# Patient Record
Sex: FEMALE | Race: WHITE | URBAN - METROPOLITAN AREA
[De-identification: names, ages, dates, MRNs, and addresses within clinical notes are randomized per-mention and may not be internally consistent; named-entity substitution may affect disease eponyms.]

---

## 2021-02-28 ENCOUNTER — IMMUNIZATION (OUTPATIENT)
Dept: IMMUNIZATION | Facility: CLINIC | Age: 21
End: 2021-02-28

## 2021-04-11 ENCOUNTER — IMMUNIZATION (OUTPATIENT)
Dept: IMMUNIZATION | Facility: CLINIC | Age: 21
End: 2021-04-11

## 2021-05-12 ENCOUNTER — LAB REQUISITION (OUTPATIENT)
Dept: LAB | Facility: HOSPITAL | Age: 21
End: 2021-05-12
Attending: PREVENTIVE MEDICINE
Payer: COMMERCIAL

## 2021-05-12 DIAGNOSIS — Z00.8 ENCOUNTER FOR OTHER GENERAL EXAMINATION: ICD-10-CM

## 2021-05-12 PROCEDURE — 86480 TB TEST CELL IMMUN MEASURE: CPT | Performed by: PREVENTIVE MEDICINE

## 2021-05-14 LAB
M TB IFN-G BLD-IMP: NEGATIVE
MITOGEN-NIL: >10 IU/ML
NIL: 0.02 IU/ML
TB AG-NIL: 0 IU/ML
TB2 AG - NIL: 0.02 IU/ML

## 2022-01-03 LAB — EXTERNAL CHLAMYDIA RESULT: NEGATIVE

## 2022-01-18 LAB
APPEARANCE UR: ABNORMAL
BACTERIA UR QL AUTO: ABNORMAL /HPF
BILIRUB UR QL STRIP: NEGATIVE
COLOR UR: YELLOW
GLUCOSE UR QL STRIP: NEGATIVE
HGB UR QL STRIP: ABNORMAL
HYALINE CASTS #/AREA URNS LPF: ABNORMAL /LPF
KETONES UR QL STRIP: ABNORMAL
LEUKOCYTE ESTERASE UR QL STRIP: ABNORMAL
NITRITE UR QL STRIP: NEGATIVE
PH UR STRIP: ABNORMAL [PH] (ref 5–8)
PROT UR QL STRIP: ABNORMAL
RBC #/AREA URNS HPF: ABNORMAL /HPF
SP GR UR STRIP: 1.02 (ref 1–1.03)
SQUAMOUS #/AREA URNS HPF: ABNORMAL /HPF
WBC #/AREA URNS HPF: ABNORMAL /HPF

## 2022-03-12 RX ORDER — NORGESTIMATE AND ETHINYL ESTRADIOL 7DAYSX3 28
1 KIT ORAL DAILY
COMMUNITY
Start: 2022-01-03

## 2022-03-15 ENCOUNTER — TELEPHONE (OUTPATIENT)
Dept: ADMINISTRATIVE | Facility: OTHER | Age: 22
End: 2022-03-15

## 2022-03-15 ENCOUNTER — OFFICE VISIT (OUTPATIENT)
Dept: FAMILY MEDICINE CLINIC | Facility: CLINIC | Age: 22
End: 2022-03-15
Payer: COMMERCIAL

## 2022-03-15 VITALS
BODY MASS INDEX: 19.94 KG/M2 | DIASTOLIC BLOOD PRESSURE: 62 MMHG | OXYGEN SATURATION: 99 % | HEART RATE: 77 BPM | TEMPERATURE: 98 F | WEIGHT: 105.6 LBS | HEIGHT: 61 IN | SYSTOLIC BLOOD PRESSURE: 110 MMHG

## 2022-03-15 DIAGNOSIS — Z11.4 SCREENING FOR HIV (HUMAN IMMUNODEFICIENCY VIRUS): ICD-10-CM

## 2022-03-15 DIAGNOSIS — Z12.4 SCREENING FOR CERVICAL CANCER: ICD-10-CM

## 2022-03-15 DIAGNOSIS — Z11.59 NEED FOR HEPATITIS C SCREENING TEST: ICD-10-CM

## 2022-03-15 DIAGNOSIS — D22.9 ATYPICAL NEVUS: Primary | ICD-10-CM

## 2022-03-15 PROBLEM — R87.610 ATYPICAL SQUAMOUS CELLS OF UNDETERMINED SIGNIFICANCE (ASCUS) ON PAPANICOLAOU SMEAR OF CERVIX: Status: ACTIVE | Noted: 2022-01-03

## 2022-03-15 PROCEDURE — 1036F TOBACCO NON-USER: CPT | Performed by: INTERNAL MEDICINE

## 2022-03-15 PROCEDURE — 99385 PREV VISIT NEW AGE 18-39: CPT | Performed by: INTERNAL MEDICINE

## 2022-03-15 PROCEDURE — 3008F BODY MASS INDEX DOCD: CPT | Performed by: INTERNAL MEDICINE

## 2022-03-15 PROCEDURE — 3725F SCREEN DEPRESSION PERFORMED: CPT | Performed by: INTERNAL MEDICINE

## 2022-03-15 NOTE — PROGRESS NOTES
Assessment/Plan:    Problem List Items Addressed This Visit        Musculoskeletal and Integument    Atypical nevus - Primary     I would like to have Dermatology look at the nevus on her left posterior shoulder since it is irregular  This probably needs to be excised  They can also comment on whether or not there needs to be a revision of the nevus that was excised from her midthoracic back in 2009  She also has some skin tags which for now can be observed  Lesion at the top of her 5th for head seems to be benign but we can certainly have Dermatology take a look at that  Relevant Orders    Ambulatory Referral to Dermatology      Other Visit Diagnoses     Need for hepatitis C screening test        Relevant Orders    Hepatitis C Antibody ([facility])    Screening for HIV (human immunodeficiency virus)        Relevant Orders    HIV 1/2 Antigen/Antibody (4th Generation) w Reflex [facility]    Screening for cervical cancer            Her mom is going to look for her shot records to see if she has had the HPV vaccination  She has a gyn and recently had a Pap smear  Chief Complaint     Physical Exam          Patient ID: Reg Wheatley is a 24 y o  female who returns for a preventive visit  She has a skin lesion on her forehead, under her left breast, and her right axilla that she would like to have evaluated  Of note, she had a nevus that was removed from her midthoracic back by what sounds like a shave biopsy in 2009  Review of the pathology report from that procedure revealed that she did have a dysplastic nevus with the margins involved on the submitted specimen  She never did have a repeat resection of this area  She reports that there is some pigment that has redeveloped in the area of the excision on her midthoracic back        Objective:    /62 (BP Location: Left arm, Patient Position: Sitting)   Pulse 77   Temp 98 °F (36 7 °C)   Ht 5' 1" (1 549 m)   Wt 47 9 kg (105 lb 9 6 oz) SpO2 99%   BMI 19 95 kg/m²     Wt Readings from Last 3 Encounters:   03/15/22 47 9 kg (105 lb 9 6 oz)         Physical Exam  Vitals reviewed  Constitutional:       General: She is not in acute distress  Appearance: Normal appearance  She is well-developed  She is not ill-appearing  HENT:      Head: Normocephalic and atraumatic  Right Ear: Tympanic membrane and external ear normal       Left Ear: Tympanic membrane and external ear normal       Nose: Nose normal       Mouth/Throat:      Mouth: Mucous membranes are moist       Pharynx: Oropharynx is clear  Eyes:      General: No scleral icterus  Extraocular Movements: Extraocular movements intact  Pupils: Pupils are equal, round, and reactive to light  Neck:      Thyroid: No thyroid mass or thyromegaly  Vascular: No JVD  Cardiovascular:      Rate and Rhythm: Normal rate and regular rhythm  Heart sounds: Normal heart sounds  No murmur heard  No friction rub  No gallop  Pulmonary:      Breath sounds: Normal breath sounds  Abdominal:      General: Bowel sounds are normal  There is no distension  Palpations: Abdomen is soft  There is no mass  Musculoskeletal:         General: No swelling  Skin:     Comments: See images attached to this encounter  She has a well-healed scar in her midthoracic back that does have some mild tan pigmentation at the periphery  There was nothing suspicious appearing about the lesion itself  On the left posterior shoulder there is an 8 millimeter tan and brown irregularly shaped nevus  On her right axillary region there is a brownish skin tag which appears to be irritated  There is also a skin tag under her left breast which appears to be irritated as well  At the apex of her forehead there was a 1-2 millimeter pearly nodule that did not have any telangiectasia or ulceration  Neurological:      Mental Status: She is alert     Psychiatric:         Mood and Affect: Mood normal

## 2022-03-15 NOTE — TELEPHONE ENCOUNTER
----- Message from Raquel Spencer sent at 3/14/2022 10:58 AM EDT -----  Regarding: Care Gap Request  03/14/22 10:58 AM    Hello, our patient has had Chlamydia completed/performed  Please assist in updating the patient chart by pulling the Care Everywhere (CE) document  The date of service is 1/3/2022       Thank you,  Raqeul Spencer   [facility]

## 2022-03-15 NOTE — TELEPHONE ENCOUNTER
Upon review of the In Basket request we were able to locate, review, and update the patient chart as requested for Pap Smear (HPV) aka Cervical Cancer Screening  Any additional questions or concerns should be emailed to the Practice Liaisons via [email or web address] email, please do not reply via In Basket      Thank you  Carlos Raya

## 2022-03-15 NOTE — TELEPHONE ENCOUNTER
Upon review of the In Basket request we were able to locate, review, and update the patient chart as requested for Chlamydia  Any additional questions or concerns should be emailed to the Practice Liaisons via [email or web address] email, please do not reply via In Basket      Thank you  Missy Reese

## 2022-03-15 NOTE — TELEPHONE ENCOUNTER
----- Message from Mery Nolan sent at 3/14/2022 10:54 AM EDT -----  Regarding: Care Gap Request  03/14/22 10:54 AM    Hello, our patient has had Pap Smear (HPV) aka Cervical Cancer Screening completed/performed  Please assist in updating the patient chart by pulling the Care Everywhere (CE) document  The date of service is 1/3/2022       Thank you,  Mery Nolan   [facility]

## 2022-03-16 NOTE — ASSESSMENT & PLAN NOTE
I would like to have Dermatology look at the nevus on her left posterior shoulder since it is irregular  This probably needs to be excised  They can also comment on whether or not there needs to be a revision of the nevus that was excised from her midthoracic back in 2009  She also has some skin tags which for now can be observed  Lesion at the top of her 5th for head seems to be benign but we can certainly have Dermatology take a look at that

## 2022-06-15 ENCOUNTER — OFFICE VISIT (OUTPATIENT)
Dept: FAMILY MEDICINE CLINIC | Facility: CLINIC | Age: 22
End: 2022-06-15
Payer: COMMERCIAL

## 2022-06-15 VITALS
BODY MASS INDEX: 19.6 KG/M2 | HEART RATE: 81 BPM | DIASTOLIC BLOOD PRESSURE: 60 MMHG | TEMPERATURE: 98.2 F | WEIGHT: 103.8 LBS | SYSTOLIC BLOOD PRESSURE: 110 MMHG | HEIGHT: 61 IN | OXYGEN SATURATION: 98 %

## 2022-06-15 DIAGNOSIS — D22.9 ATYPICAL NEVUS: ICD-10-CM

## 2022-06-15 DIAGNOSIS — S29.011D MUSCLE STRAIN OF CHEST WALL, SUBSEQUENT ENCOUNTER: Primary | ICD-10-CM

## 2022-06-15 PROCEDURE — 3008F BODY MASS INDEX DOCD: CPT | Performed by: PHYSICIAN ASSISTANT

## 2022-06-15 PROCEDURE — 99213 OFFICE O/P EST LOW 20 MIN: CPT | Performed by: PHYSICIAN ASSISTANT

## 2022-06-15 PROCEDURE — 1036F TOBACCO NON-USER: CPT | Performed by: PHYSICIAN ASSISTANT

## 2022-06-15 NOTE — PROGRESS NOTES
Assessment/Plan:    No problem-specific Assessment & Plan notes found for this encounter  Diagnoses and all orders for this visit:    Muscle strain of chest wall, subsequent encounter    Atypical nevus      This patient has had pain in the right anterior lateral chest wall for the last month  No acute injury noted  She is right-handed dominant  She saw her gynecologist who performed a breast exam and identify the area of soreness in the right lateral chest wall  Patient has subsequently had an ultrasound performed and there was no adenopathy or masses noted  Performing full physical exam, I identified the area of tenderness attached to the insertion site of the chest wall muscle which moved with movement of her shoulder with flexion and abduction  This was the area she identified as being painful and it is a muscle strain or tendonitis in that muscle group  We had a discussion about the benign nature of this muscle pain  Patient has not yet scheduled her dermatology appointment for assessment of the atypical nevus of the left upper shoulder  There is some variegation in this nevus and I believe that Dr Bianka Ryan  referral was completely appropriate  I have reinforced the necessity of having dermatology evaluate this  Subjective:      Patient ID: Andriy Connor is a 24 y o  female  She has requested this visit as her 3rd visit evaluating the same complaint of pain in the right inferior axilla and chest wall  HPI:  This 50-year-old female sees Dr Otis Martinez for her primary care services  She had seen her gynecologist on May 12th due to pain in what she described the right axilla  A breast exam was performed revealing tenderness in the right axillary lateral chest wall  Ultrasound was performed at [facility] interpreted by Dr Manisha Whitney revealing no axillary adenopathy or masses  Patient describes this pain as a feeling like a bruising type pain but no bruise was present    She had been placed on amoxicillin by her gynecologist for this pain in the amoxicillin did nothing  Patient states that she does not do any activities which would strain any chest wall or shoulder girdle muscle  She has not really played sports since middle school basketball  She has never been in a marching band or served as a flag twirler  No overhead activities including no painting  Nothing seems to bring this pain on and nothing seems to relieve the pain  She is a side sleeper and tucks the right upper extremity underneath her pillow  No fever chills or night sweats  No adenopathy in any body part  A total of 22 minutes was spent rendering care for this patient  This time included review of the patient's electronic medical record, performing the history and physical, reviewing appropriate labs and/or images, developing a treatment and assessment plan, answering patient's questions and concerns, and documenting the patient visit  She has an appoint with Dr Rae January on March 20, 2022    Review of Systems  :  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   She is taking her boards in 1 week and then will start her job at [employer] in [place]  She is not unusually stressed  She denies any headache any neck pain any neck stiffness any nuchal rigidity  She denies paresthesias going down either upper extremity  She denies any weakness in upper or lower extremities  She admits to not having a lot of physical activity  She denied any changes in her bowel or bladder habits  She denies fever or chills      Objective:      /60 (BP Location: Left arm, Patient Position: Sitting, Cuff Size: Standard)   Pulse 81   Temp 98 2 °F (36 8 °C)   Ht 5' 1" (1 549 m)   Wt 47 1 kg (103 lb 12 8 oz)   SpO2 98%   BMI 19 61 kg/m²          Physical Exam  well-developed well-nourished pleasant 59-year-old female who was cooperative with the exam   She is appropriate in answering questions  Examination of the HEENT revealed normal range of motion of her neck  No pre or posterior auricular adenopathy  No cervical adenopathy  No supraclavicular adenopathy  Her chest is symmetric and nontender  Heart is regular rate without murmur rub or gallops  Lungs are clear to auscultation  Patient has asymmetry of the musculature in both lateral chest walls just below the axilla  She has increased muscle mass on the right side compared to the left and muscle was confirmed as this moves with motion of her shoulder  Palpation of this hypertrophied muscle reproduces her pain  It appears that this is a tendinitis in her chest wall that has just taken some time to respond conservative care  Patient's abdomen is flat and soft positive bowel sounds  She has no inguinal adenopathy  No epitrochlear adenopathy  Adequate peripheral pulses without peripheral edema  Integument:  Patient has a 1 x 1 cm variegated nevus which has been previously identified by Dr Sammi Morel with referral to Dermatology for further evaluation and possible biopsy  I have reinforced the need to have this evaluated

## 2022-06-15 NOTE — PATIENT INSTRUCTIONS
Patient to apply topical menthol such as blue emu 2 insertion site of her muscle in the right lateral chest wall  This muscle irritation is concerned by having what appears to be a slight hypertrophy of muscle move when I do abduction and flexion of her right shoulder  This muscle movement reproduces her pain  If heat is not soothing to the patient, try ice to the area

## 2023-01-05 LAB
EXTERNAL CHLAMYDIA RESULT: NOT DETECTED
N GONORRHOEA RRNA SPEC QL PROBE: NORMAL

## 2023-03-28 ENCOUNTER — LAB (OUTPATIENT)
Dept: LAB | Facility: CLINIC | Age: 23
End: 2023-03-28

## 2023-03-28 ENCOUNTER — OFFICE VISIT (OUTPATIENT)
Dept: FAMILY MEDICINE CLINIC | Facility: CLINIC | Age: 23
End: 2023-03-28

## 2023-03-28 VITALS
DIASTOLIC BLOOD PRESSURE: 78 MMHG | SYSTOLIC BLOOD PRESSURE: 122 MMHG | HEIGHT: 61 IN | OXYGEN SATURATION: 99 % | BODY MASS INDEX: 20.96 KG/M2 | WEIGHT: 111 LBS | TEMPERATURE: 97.3 F | HEART RATE: 78 BPM

## 2023-03-28 DIAGNOSIS — Z11.59 NEED FOR HEPATITIS C SCREENING TEST: ICD-10-CM

## 2023-03-28 DIAGNOSIS — R42 DIZZINESS: Primary | ICD-10-CM

## 2023-03-28 DIAGNOSIS — Z11.4 SCREENING FOR HIV (HUMAN IMMUNODEFICIENCY VIRUS): ICD-10-CM

## 2023-03-28 DIAGNOSIS — R42 DIZZINESS: ICD-10-CM

## 2023-03-28 DIAGNOSIS — D22.9 ATYPICAL NEVUS: ICD-10-CM

## 2023-03-28 DIAGNOSIS — Z23 NEED FOR HPV VACCINE: ICD-10-CM

## 2023-03-28 LAB
ERYTHROCYTE [DISTWIDTH] IN BLOOD BY AUTOMATED COUNT: 12.9 % (ref 11.6–15.1)
HCT VFR BLD AUTO: 43 % (ref 34.8–46.1)
HGB BLD-MCNC: 14.2 G/DL (ref 11.5–15.4)
MCH RBC QN AUTO: 29.7 PG (ref 26.8–34.3)
MCHC RBC AUTO-ENTMCNC: 33 G/DL (ref 31.4–37.4)
MCV RBC AUTO: 90 FL (ref 82–98)
PLATELET # BLD AUTO: 270 THOUSANDS/UL (ref 149–390)
PMV BLD AUTO: 12.3 FL (ref 8.9–12.7)
RBC # BLD AUTO: 4.78 MILLION/UL (ref 3.81–5.12)
WBC # BLD AUTO: 11.56 THOUSAND/UL (ref 4.31–10.16)

## 2023-03-28 NOTE — ASSESSMENT & PLAN NOTE
Nevus is unchanged but I stressed that she does need to have this evaluated and potentially excised  I put in another referral to dermatology

## 2023-03-28 NOTE — PROGRESS NOTES
"Assessment/Plan:    Problem List Items Addressed This Visit        Musculoskeletal and Integument    Atypical nevus     Nevus is unchanged but I stressed that she does need to have this evaluated and potentially excised  I put in another referral to dermatology  Relevant Orders    Ambulatory Referral to Dermatology       Other    Dizziness - Primary     Unclear etiology  EKG was normal   We will check CBC  Consider Holter monitor  Relevant Orders    CBC    POCT ECG (Completed)   Other Visit Diagnoses     Need for hepatitis C screening test        Relevant Orders    Hepatitis C Antibody ([facility])    Screening for HIV (human immunodeficiency virus)        Relevant Orders    HIV 1/2 AG/AB w Reflex [facility] for 2 yr old and above    Need for HPV vaccine        Relevant Orders    HPV VACCINE 9 VALENT IM          Chief Complaint    Physical Exam; light headed         Patient ID: Patrica Galarza is a 20 y o  female who returns for a preventive visit  She has been dizzy and lightheaded for the past 5 days  It has no relationship to position  No syncope  No vertiginous symptoms  No recent URI or GI infection  Objective:    /78 (BP Location: Left arm, Patient Position: Sitting, Cuff Size: Standard)   Pulse 78   Temp (!) 97 3 °F (36 3 °C)   Ht 5' 1\" (1 549 m)   Wt 50 3 kg (111 lb)   SpO2 99%   BMI 20 97 kg/m²     Wt Readings from Last 3 Encounters:   03/28/23 50 3 kg (111 lb)   06/15/22 47 1 kg (103 lb 12 8 oz)   03/15/22 47 9 kg (105 lb 9 6 oz)         Physical Exam  Vitals reviewed  Constitutional:       General: She is not in acute distress  Appearance: Normal appearance  She is well-developed  She is not ill-appearing  HENT:      Head: Normocephalic and atraumatic        Right Ear: Tympanic membrane and external ear normal       Left Ear: Tympanic membrane and external ear normal       Nose: Nose normal       Mouth/Throat:      Mouth: Mucous membranes are moist       Pharynx: " Oropharynx is clear  Eyes:      General: No scleral icterus  Extraocular Movements: Extraocular movements intact  Pupils: Pupils are equal, round, and reactive to light  Neck:      Thyroid: No thyroid mass or thyromegaly  Vascular: No JVD  Cardiovascular:      Rate and Rhythm: Normal rate and regular rhythm  Heart sounds: Normal heart sounds  No murmur heard  No friction rub  No gallop  Pulmonary:      Breath sounds: Normal breath sounds  Abdominal:      General: Bowel sounds are normal  There is no distension  Palpations: Abdomen is soft  There is no mass  Musculoskeletal:         General: No swelling  Skin:     Comments: 4 mm irregularly shaped nevus left posterior shoulder  Unchanged from last year  Neurological:      Mental Status: She is alert     Psychiatric:         Mood and Affect: Mood normal

## 2023-03-29 LAB
HCV AB SER QL: NORMAL
HIV 1+2 AB+HIV1 P24 AG SERPL QL IA: NORMAL
HIV 2 AB SERPL QL IA: NORMAL
HIV1 AB SERPL QL IA: NORMAL
HIV1 P24 AG SERPL QL IA: NORMAL

## 2023-03-30 ENCOUNTER — TELEPHONE (OUTPATIENT)
Dept: ADMINISTRATIVE | Facility: OTHER | Age: 23
End: 2023-03-30

## 2023-03-30 ENCOUNTER — HOSPITAL ENCOUNTER (EMERGENCY)
Facility: HOSPITAL | Age: 23
Discharge: HOME | End: 2023-03-30
Attending: EMERGENCY MEDICINE

## 2023-03-30 VITALS
BODY MASS INDEX: 20.66 KG/M2 | HEART RATE: 100 BPM | WEIGHT: 109.35 LBS | SYSTOLIC BLOOD PRESSURE: 131 MMHG | TEMPERATURE: 97.3 F | OXYGEN SATURATION: 100 % | DIASTOLIC BLOOD PRESSURE: 85 MMHG | RESPIRATION RATE: 16 BRPM

## 2023-03-30 DIAGNOSIS — R42 LIGHTHEADEDNESS: Primary | ICD-10-CM

## 2023-03-30 LAB
ANION GAP SERPL CALCULATED.3IONS-SCNC: 13 MMOL/L (ref 4–13)
BACTERIA UR QL AUTO: NORMAL /HPF
BASOPHILS # BLD AUTO: 0.03 THOUSANDS/ÂΜL (ref 0–0.1)
BASOPHILS NFR BLD AUTO: 0 % (ref 0–1)
BILIRUB UR QL STRIP: NEGATIVE
BUN SERPL-MCNC: 9 MG/DL (ref 5–25)
CALCIUM SERPL-MCNC: 9.1 MG/DL (ref 8.4–10.2)
CHLORIDE SERPL-SCNC: 104 MMOL/L (ref 96–108)
CLARITY UR: CLEAR
CO2 SERPL-SCNC: 19 MMOL/L (ref 21–32)
COLOR UR: ABNORMAL
CREAT SERPL-MCNC: 0.64 MG/DL (ref 0.6–1.3)
EOSINOPHIL # BLD AUTO: 0.14 THOUSAND/ÂΜL (ref 0–0.61)
EOSINOPHIL NFR BLD AUTO: 1 % (ref 0–6)
ERYTHROCYTE [DISTWIDTH] IN BLOOD BY AUTOMATED COUNT: 12.5 % (ref 11.6–15.1)
EXT PREGNANCY TEST URINE: NEGATIVE
EXT. CONTROL: NORMAL
GFR SERPL CREATININE-BSD FRML MDRD: 126 ML/MIN/1.73SQ M
GLUCOSE SERPL-MCNC: 80 MG/DL (ref 65–140)
GLUCOSE UR STRIP-MCNC: NEGATIVE MG/DL
HCT VFR BLD AUTO: 44.2 % (ref 34.8–46.1)
HGB BLD-MCNC: 14.4 G/DL (ref 11.5–15.4)
HGB UR QL STRIP.AUTO: NEGATIVE
IMM GRANULOCYTES # BLD AUTO: 0.04 THOUSAND/UL (ref 0–0.2)
IMM GRANULOCYTES NFR BLD AUTO: 0 % (ref 0–2)
KETONES UR STRIP-MCNC: ABNORMAL MG/DL
LEUKOCYTE ESTERASE UR QL STRIP: ABNORMAL
LYMPHOCYTES # BLD AUTO: 2.53 THOUSANDS/ÂΜL (ref 0.6–4.47)
LYMPHOCYTES NFR BLD AUTO: 25 % (ref 14–44)
MAGNESIUM SERPL-MCNC: 1.8 MG/DL (ref 1.9–2.7)
MCH RBC QN AUTO: 29.3 PG (ref 26.8–34.3)
MCHC RBC AUTO-ENTMCNC: 32.6 G/DL (ref 31.4–37.4)
MCV RBC AUTO: 90 FL (ref 82–98)
MONOCYTES # BLD AUTO: 0.64 THOUSAND/ÂΜL (ref 0.17–1.22)
MONOCYTES NFR BLD AUTO: 6 % (ref 4–12)
NEUTROPHILS # BLD AUTO: 6.74 THOUSANDS/ÂΜL (ref 1.85–7.62)
NEUTS SEG NFR BLD AUTO: 68 % (ref 43–75)
NITRITE UR QL STRIP: NEGATIVE
NON-SQ EPI CELLS URNS QL MICRO: NORMAL /HPF
NRBC BLD AUTO-RTO: 0 /100 WBCS
OTHER STN SPEC: NORMAL
PH UR STRIP.AUTO: 6 [PH]
PLATELET # BLD AUTO: 253 THOUSANDS/UL (ref 149–390)
PMV BLD AUTO: 11.5 FL (ref 8.9–12.7)
POTASSIUM SERPL-SCNC: 3.5 MMOL/L (ref 3.5–5.3)
PROT UR STRIP-MCNC: NEGATIVE MG/DL
RBC # BLD AUTO: 4.92 MILLION/UL (ref 3.81–5.12)
RBC #/AREA URNS AUTO: NORMAL /HPF
SODIUM SERPL-SCNC: 136 MMOL/L (ref 135–147)
SP GR UR STRIP.AUTO: <=1.005 (ref 1–1.03)
UROBILINOGEN UR QL STRIP.AUTO: 0.2 E.U./DL
WBC # BLD AUTO: 10.12 THOUSAND/UL (ref 4.31–10.16)
WBC #/AREA URNS AUTO: NORMAL /HPF

## 2023-03-30 RX ORDER — MAGNESIUM SULFATE 1 G/100ML
1 INJECTION INTRAVENOUS ONCE
Status: COMPLETED | OUTPATIENT
Start: 2023-03-30 | End: 2023-03-30

## 2023-03-30 RX ADMIN — MAGNESIUM SULFATE HEPTAHYDRATE 1 G: 1 INJECTION, SOLUTION INTRAVENOUS at 19:48

## 2023-03-30 NOTE — TELEPHONE ENCOUNTER
Upon review of the In Basket request we were able to locate, review, and update the patient chart as requested for Chlamydia  Any additional questions or concerns should be emailed to the Practice Liaisons via the appropriate education email address, please do not reply via In Basket      Thank you  Taylor Singh

## 2023-03-30 NOTE — ED NOTES
Pt unable to provide urine sample at this time  Made aware of need for sample        Ariel Jesus RN  03/30/23 [id number]

## 2023-03-30 NOTE — TELEPHONE ENCOUNTER
----- Message from Nancy Hayes sent at 3/29/2023  1:00 PM EDT -----  Regarding: Care Gap request  03/29/23 1:00 PM    Hello, our patient attached above has had Chlamydia completed/performed  Please assist in updating the patient chart by pulling the Care Everywhere (CE) document  The date of service is 1/5/2023       Thank you,  Camryn [facility]NDEZ [facility]

## 2023-03-30 NOTE — TELEPHONE ENCOUNTER
Upon review of the In Basket request we were able to locate, review, and update the patient chart as requested for Pap Smear (HPV) aka Cervical Cancer Screening  Any additional questions or concerns should be emailed to the Practice Liaisons via the appropriate education email address, please do not reply via In Basket      Thank you  Nazanin Goel

## 2023-03-30 NOTE — ED PROVIDER NOTES
"History  Chief Complaint   Patient presents with   • Dizziness     Started last Thursday with feeling lightheaded constantly, denies N/V/D, states nothing makes better or worse, seen Monday @PCP and had EKG and lab work, waiting on holter monitor, had eval @PT today and ruled out vertigo     79-year-old female presents emergency department with her mother for evaluation of lightheadedness  Patient states she has been feeling lightheaded \"24/7\" since last Thursday  States she is unsure what she was doing when this sensation started  Denies any traumatic or stressful event  Denies any sudden onset  States this was gradual however persistent  She denies any visual changes  Denies headache  Denies effusion or dizziness  She denies nausea or vomiting  States nothing seems to make sensation better or worse  Reports she was seen by her PCP and had blood work done which was negative  Per chart review this was a CBC, HIV and hepatitis panel that were performed  Patient was also evaluated by physical therapist today for vertigo  Patient reports physical therapist ruled out vertigo and said that this was unlikely cause of patient's dizziness  Patient denies any dysuria, hematuria or urinary frequency  She denies any chance of pregnancy  Patient reports a normal appetite  History provided by:  Patient  Dizziness  Quality:  Lightheadedness  Severity:  Mild  Onset quality:  Gradual  Timing:  Constant  Progression:  Unchanged  Chronicity:  New  Relieved by:  Nothing  Worsened by:  Nothing  Ineffective treatments:  Being still, food and lying down  Associated symptoms: no blood in stool, no chest pain, no diarrhea, no headaches, no hearing loss, no nausea, no palpitations, no shortness of breath, no syncope, no tinnitus, no vision changes, no vomiting and no weakness        Prior to Admission Medications   Prescriptions Last Dose Informant Patient Reported?  Taking?   norgestimate-ethinyl estradiol (ORTHO " TRI-CYCLEN,TRINESSA) 0 18/0 215/0 25 MG-35 MCG per tablet   Yes No   Sig: Take 1 tablet by mouth daily      Facility-Administered Medications: None       Past Medical History:   Diagnosis Date   • Dysplastic nevus 2009    DYSPLASTIC COMPOUND NEVUS WITH MILD ATYPIA, MARGINS INVOLVED       Past Surgical History:   Procedure Laterality Date   • WISDOM TOOTH EXTRACTION         Family History   Problem Relation Age of Onset   • Hypertension Father    • Hyperlipidemia Father    • Esophageal cancer Maternal Grandfather      I have reviewed and agree with the history as documented  E-Cigarette/Vaping   • E-Cigarette Use Never User      E-Cigarette/Vaping Substances     Social History     Tobacco Use   • Smoking status: Never   • Smokeless tobacco: Never   Vaping Use   • Vaping Use: Never used   Substance Use Topics   • Alcohol use: Yes     Alcohol/week: 2 0 standard drinks     Types: 2 Standard drinks or equivalent per week     Comment: social   • Drug use: Never       Review of Systems   Constitutional: Negative  HENT: Negative for hearing loss and tinnitus  Respiratory: Negative  Negative for shortness of breath  Cardiovascular: Negative  Negative for chest pain, palpitations and syncope  Gastrointestinal: Negative  Negative for blood in stool, diarrhea, nausea and vomiting  Genitourinary: Negative  Musculoskeletal: Negative  Skin: Negative  Neurological: Positive for dizziness and light-headedness  Negative for weakness and headaches  All other systems reviewed and are negative  Physical Exam  Physical Exam  Vitals and nursing note reviewed  Constitutional:       General: She is not in acute distress  Appearance: Normal appearance  She is normal weight  She is not ill-appearing, toxic-appearing or diaphoretic  HENT:      Head: Normocephalic and atraumatic  Comments: Patient reported lightheadedness worsened with eye movement  No nystagmus    She reported no change with head rotation     Right Ear: Tympanic membrane, ear canal and external ear normal       Left Ear: Tympanic membrane, ear canal and external ear normal       Nose: Nose normal  No congestion or rhinorrhea  Mouth/Throat:      Mouth: Mucous membranes are moist       Pharynx: Oropharynx is clear  No oropharyngeal exudate or posterior oropharyngeal erythema  Eyes:      Extraocular Movements: Extraocular movements intact  Conjunctiva/sclera: Conjunctivae normal       Pupils: Pupils are equal, round, and reactive to light  Comments: No nystagmus   Cardiovascular:      Rate and Rhythm: Normal rate and regular rhythm  Pulmonary:      Effort: Pulmonary effort is normal  No respiratory distress  Breath sounds: Normal breath sounds  No stridor  No wheezing, rhonchi or rales  Chest:      Chest wall: No tenderness  Abdominal:      General: Abdomen is flat  Bowel sounds are normal  There is no distension  Palpations: Abdomen is soft  Tenderness: There is no abdominal tenderness  There is no guarding  Musculoskeletal:         General: Normal range of motion  Cervical back: Normal range of motion  Skin:     General: Skin is warm and dry  Capillary Refill: Capillary refill takes less than 2 seconds  Findings: No bruising, erythema or rash  Neurological:      General: No focal deficit present  Mental Status: She is alert and oriented to person, place, and time  GCS: GCS eye subscore is 4  GCS verbal subscore is 5  GCS motor subscore is 6  Cranial Nerves: Cranial nerves 2-12 are intact  Sensory: Sensation is intact  Motor: Motor function is intact  Coordination: Coordination is intact  Finger-Nose-Finger Test normal       Gait: Gait is intact     Psychiatric:         Mood and Affect: Mood normal          Behavior: Behavior normal          Vital Signs  ED Triage Vitals [03/30/23 1805]   Temperature Pulse Respirations Blood Pressure SpO2   (!) 97 3 °F (36 3 °C) 92 16 134/87 100 %      Temp src Heart Rate Source Patient Position - Orthostatic VS BP Location FiO2 (%)   -- Monitor Lying Right arm --      Pain Score       --           Vitals:    03/30/23 1805 03/30/23 1830   BP: 134/87 131/85   Pulse: 92 100   Patient Position - Orthostatic VS: Lying          Visual Acuity      ED Medications  Medications   magnesium sulfate IVPB (premix) SOLN 1 g (0 g Intravenous Stopped 3/30/23 2100)       Diagnostic Studies  Results Reviewed     Procedure Component Value Units Date/Time    Urine Microscopic [[id number]] Collected: 03/30/23 1921    Lab Status: Final result Specimen: Urine, Clean Catch Updated: 03/30/23 1953     RBC, UA 0-1 /hpf      WBC, UA 2-4 /hpf      Epithelial Cells Occasional /hpf      Bacteria, UA Occasional /hpf      OTHER OBSERVATIONS Yeast Cells Present    UA (URINE) with reflex to Scope [[id number]]  (Abnormal) Collected: 03/30/23 1921    Lab Status: Final result Specimen: Urine, Clean Catch Updated: 03/30/23 1932     Color, UA Straw     Clarity, UA Clear     Specific Gravity, UA <=1 005     pH, UA 6 0     Leukocytes, UA Small     Nitrite, UA Negative     Protein, UA Negative mg/dl      Glucose, UA Negative mg/dl      Ketones, UA Trace mg/dl      Urobilinogen, UA 0 2 E U /dl      Bilirubin, UA Negative     Occult Blood, UA Negative    POCT pregnancy, urine [[id number]]  (Normal) Resulted: 03/30/23 1921    Lab Status: Final result Updated: 03/30/23 1925     EXT Preg Test, Ur Negative     Control Valid    Basic metabolic panel [[id number]]  (Abnormal) Collected: 03/30/23 1846    Lab Status: Final result Specimen: Blood from Arm, Right Updated: 03/30/23 1919     Sodium 136 mmol/L      Potassium 3 5 mmol/L      Chloride 104 mmol/L      CO2 19 mmol/L      ANION GAP 13 mmol/L      BUN 9 mg/dL      Creatinine 0 64 mg/dL      Glucose 80 mg/dL      Calcium 9 1 mg/dL      eGFR 126 ml/min/1 73sq m     Narrative:      [place] guidelines for Chronic Kidney Disease (CKD):   •  Stage 1 with normal or high GFR (GFR > 90 mL/min/1 73 square meters)  •  Stage 2 Mild CKD (GFR = 60-89 mL/min/1 73 square meters)  •  Stage 3A Moderate CKD (GFR = 45-59 mL/min/1 73 square meters)  •  Stage 3B Moderate CKD (GFR = 30-44 mL/min/1 73 square meters)  •  Stage 4 Severe CKD (GFR = 15-29 mL/min/1 73 square meters)  •  Stage 5 End Stage CKD (GFR <15 mL/min/1 73 square meters)  Note: GFR calculation is accurate only with a steady state creatinine    Magnesium [[id number]]  (Abnormal) Collected: 03/30/23 1846    Lab Status: Final result Specimen: Blood from Arm, Right Updated: 03/30/23 1919     Magnesium 1 8 mg/dL     CBC and differential [[id number]] Collected: 03/30/23 1846    Lab Status: Final result Specimen: Blood from Arm, Right Updated: 03/30/23 1904     WBC 10 12 Thousand/uL      RBC 4 92 Million/uL      Hemoglobin 14 4 g/dL      Hematocrit 44 2 %      MCV 90 fL      MCH 29 3 pg      MCHC 32 6 g/dL      RDW 12 5 %      MPV 11 5 fL      Platelets 984 Thousands/uL      nRBC 0 /100 WBCs      Neutrophils Relative 68 %      Immat GRANS % 0 %      Lymphocytes Relative 25 %      Monocytes Relative 6 %      Eosinophils Relative 1 %      Basophils Relative 0 %      Neutrophils Absolute 6 74 Thousands/µL      Immature Grans Absolute 0 04 Thousand/uL      Lymphocytes Absolute 2 53 Thousands/µL      Monocytes Absolute 0 64 Thousand/µL      Eosinophils Absolute 0 14 Thousand/µL      Basophils Absolute 0 03 Thousands/µL                  No orders to display              Procedures  ECG 12 Lead Documentation Only    Date/Time: 3/30/2023 6:59 PM  Performed by: David Lemus PA-C  Authorized by: David Lemus PA-C     Patient location:  ED  Interpretation:     Interpretation: non-specific    Rate:     ECG rate:  89    ECG rate assessment: normal    Rhythm:     Rhythm: sinus rhythm    Ectopy:     Ectopy: none    QRS:     QRS axis:  Normal    QRS intervals:  Normal  Conduction: Conduction: normal               ED Course  ED Course as of 03/31/23 1026   Thu Mar 30, 2023   1916 WBC: 10 12   1916 Hemoglobin: 14 4   1926 Magnesium(!): 1 8   1926 PREGNANCY TEST URINE: Negative   1926 Patient's urine is negative for UTI  She denies any urinary symptoms including dysuria, hematuria or urinary frequency  There was noted yeast which I discussed with the patient  She denies any abnormal vaginal discharge or vaginal itch    1926 I again discussed all results and findings with the patient and mother  We discussed symptomatic treatment at home and return precautions  We discussed the appropriate follow-up with PCP  We discussed staying well-hydrated  Patient was agreeable this treatment plan she was clinically and hemodynamically stable for discharge                 Medical Decision Making  79-year-old female presented to the emergency department for evaluation of lightheadedness for the past several days consistent  Vitals and medical record reviewed  Differential diagnosis included but not limited to: Pregnancy, electrolyte abnormality, arrhythmia  Patient's laboratory findings were relatively unremarkable  Very minimally low magnesium which was repleted in the emergency department  Urine pregnancy negative  UA negative for UTI, likely contaminated  Hemoglobin normal   EKG was normal sinus rhythm  We discussed all results and findings  We discussed symptomatic treatment at home and return precautions we discussed appropriate follow-up and patient and mother verbalized understanding  Patient was clinically and hemodynamically stable for discharge    Lightheadedness: acute illness or injury  Amount and/or Complexity of Data Reviewed  External Data Reviewed: labs and notes  Labs: ordered  Decision-making details documented in ED Course  ECG/medicine tests: ordered and independent interpretation performed  Risk  Prescription drug management            Disposition  Final diagnoses: Lightheadedness     Time reflects when diagnosis was documented in both MDM as applicable and the Disposition within this note     Time User Action Codes Description Comment    3/30/2023  8:36 PM Jacek Kamaraia Add [R42] [address] [facility]       ED Disposition     ED Disposition   Discharge    Condition   Stable    Date/Time   Thu Mar 30, 2023  8:36 PM    Comment   Blanco Esteves discharge to home/self care  Follow-up Information     Follow up With Specialties Details Why [address]undo Iniguez MD Internal Medicine   [address] [facility] [address]  [phone]            Discharge Medication List as of 3/30/2023  8:37 PM      CONTINUE these medications which have NOT CHANGED    Details   norgestimate-ethinyl estradiol (ORTHO TRI-CYCLEN,TRINESSA) 0 18/0 215/0 25 MG-35 MCG per tablet Take 1 tablet by mouth daily, Starting Mon 1/3/2022, Historical Med             No discharge procedures on file      PDMP Review     None          ED Provider  Electronically Signed by           Lorriane Duane, PA-C  03/31/23 9478

## 2023-03-31 LAB
ATRIAL RATE: 89 BPM
P AXIS: 75 DEGREES
PR INTERVAL: 142 MS
QRS AXIS: 72 DEGREES
QRSD INTERVAL: 74 MS
QT INTERVAL: 364 MS
QTC INTERVAL: 442 MS
T WAVE AXIS: 58 DEGREES
VENTRICULAR RATE: 89 BPM

## 2023-04-03 ENCOUNTER — HOSPITAL ENCOUNTER (OUTPATIENT)
Dept: NON INVASIVE DIAGNOSTICS | Facility: HOSPITAL | Age: 23
Discharge: HOME | End: 2023-04-03
Attending: INTERNAL MEDICINE

## 2023-04-03 DIAGNOSIS — R42 DIZZINESS: ICD-10-CM

## 2023-04-25 ENCOUNTER — TELEPHONE (OUTPATIENT)
Dept: FAMILY MEDICINE CLINIC | Facility: CLINIC | Age: 23
End: 2023-04-25

## 2023-04-25 DIAGNOSIS — R42 DIZZINESS: Primary | ICD-10-CM

## 2023-04-25 NOTE — TELEPHONE ENCOUNTER
Called patients mother made aware order placed for MRI, she said will call to schedule gave her the number for central scheduling

## 2023-04-25 NOTE — TELEPHONE ENCOUNTER
Patients mother called regarding patients neurology consult, They reached out to the neurologist numbers that we provided them with, and they are unable to get an appointment until November  She states that her daughters symptoms are not improving and she is very concerned  She wants an MRI ordered now she is not worried if the insurance will cover it or not, she is more concerned with her daughters health  She said if it not addressed today her family will be looking for a new PCP office

## 2023-05-10 ENCOUNTER — HOSPITAL ENCOUNTER (OUTPATIENT)
Dept: MRI IMAGING | Facility: HOSPITAL | Age: 23
Discharge: HOME | End: 2023-05-10
Attending: INTERNAL MEDICINE

## 2023-05-10 DIAGNOSIS — R42 DIZZINESS: ICD-10-CM

## 2023-05-10 RX ADMIN — GADOBUTROL 5 ML: 604.72 INJECTION INTRAVENOUS at 13:17

## 2023-08-17 ENCOUNTER — RA CDI HCC (OUTPATIENT)
Dept: OTHER | Facility: HOSPITAL | Age: 23
End: 2023-08-17

## 2023-08-17 NOTE — PROGRESS NOTES
[address] [facility] coding opportunities       Chart reviewed, no opportunity found: CHART REVIEWED, NO OPPORTUNITY FOUND        Patients Insurance        Commercial Insurance: [employer]

## 2023-08-29 ENCOUNTER — OFFICE VISIT (OUTPATIENT)
Dept: FAMILY MEDICINE CLINIC | Facility: CLINIC | Age: 23
End: 2023-08-29
Payer: COMMERCIAL

## 2023-08-29 VITALS
BODY MASS INDEX: 20.43 KG/M2 | WEIGHT: 108.2 LBS | SYSTOLIC BLOOD PRESSURE: 123 MMHG | HEIGHT: 61 IN | HEART RATE: 85 BPM | DIASTOLIC BLOOD PRESSURE: 62 MMHG | OXYGEN SATURATION: 100 %

## 2023-08-29 DIAGNOSIS — F41.1 GAD (GENERALIZED ANXIETY DISORDER): Primary | ICD-10-CM

## 2023-08-29 PROCEDURE — 99213 OFFICE O/P EST LOW 20 MIN: CPT | Performed by: INTERNAL MEDICINE

## 2023-08-29 RX ORDER — VENLAFAXINE HYDROCHLORIDE 37.5 MG/1
37.5 CAPSULE, EXTENDED RELEASE ORAL
Qty: 30 CAPSULE | Refills: 5 | Status: SHIPPED | OUTPATIENT
Start: 2023-08-29

## 2023-08-29 NOTE — ASSESSMENT & PLAN NOTE
We will start her on venlafaxine Exar 37.5 mg daily. Discussed side effects. We'll reassess in about 4 weeks.

## 2023-08-29 NOTE — PROGRESS NOTES
Assessment/Plan:    Problem List Items Addressed This Visit        Other    CASSI (generalized anxiety disorder) - Primary     We will start her on venlafaxine Exar 37.5 mg daily. Discussed side effects. We'll reassess in about 4 weeks. Relevant Medications    venlafaxine (EFFEXOR-XR) 37.5 mg 24 hr capsule       Chief Complaint    Anxiety         Patient ID: Shane Dave is a 20 y.o. female who returns for evaluation of anxiety. She had some issues in sophomore of college. Has been worse the past few months. She had an episode in July when she was very anxious and couldn't catch her breath. Afterwards, she cried for a couple of hours. She hasn't been using alcohol or drugs. Her CASSI-7 today is 12.       Objective:    /62 (BP Location: Right arm, Patient Position: Sitting, Cuff Size: Standard)   Pulse 85   Ht 5' 1" (1.549 m)   Wt 49.1 kg (108 lb 3.2 oz)   SpO2 100%   BMI 20.44 kg/m²     Wt Readings from Last 3 Encounters:   08/29/23 49.1 kg (108 lb 3.2 oz)   03/30/23 49.6 kg (109 lb 5.6 oz)   03/28/23 50.3 kg (111 lb)         Physical Exam

## 2023-09-20 DIAGNOSIS — F41.1 GAD (GENERALIZED ANXIETY DISORDER): ICD-10-CM

## 2023-09-20 RX ORDER — VENLAFAXINE HYDROCHLORIDE 37.5 MG/1
CAPSULE, EXTENDED RELEASE ORAL
Qty: 90 CAPSULE | Refills: 3 | Status: SHIPPED | OUTPATIENT
Start: 2023-09-20

## 2023-11-28 ENCOUNTER — OFFICE VISIT (OUTPATIENT)
Dept: FAMILY MEDICINE CLINIC | Facility: CLINIC | Age: 23
End: 2023-11-28
Payer: COMMERCIAL

## 2023-11-28 VITALS
HEIGHT: 61 IN | HEART RATE: 75 BPM | OXYGEN SATURATION: 99 % | SYSTOLIC BLOOD PRESSURE: 110 MMHG | WEIGHT: 109.6 LBS | DIASTOLIC BLOOD PRESSURE: 72 MMHG | BODY MASS INDEX: 20.69 KG/M2

## 2023-11-28 DIAGNOSIS — F41.1 GAD (GENERALIZED ANXIETY DISORDER): Primary | ICD-10-CM

## 2023-11-28 PROCEDURE — 99213 OFFICE O/P EST LOW 20 MIN: CPT | Performed by: INTERNAL MEDICINE

## 2023-11-28 RX ORDER — FLUOXETINE 10 MG/1
10 TABLET, FILM COATED ORAL DAILY
Qty: 30 TABLET | Refills: 5 | Status: SHIPPED | OUTPATIENT
Start: 2023-11-28

## 2023-11-28 NOTE — ASSESSMENT & PLAN NOTE
I will switch her from venlafaxine (Effexor) 37.5 mg to fluoxetine (Prozac) 10 mg daily for 30 days. Instructed to stop the Effexor tomorrow and then start fluoxetine.

## 2023-11-28 NOTE — PROGRESS NOTES
Assessment/Plan:    Problem List Items Addressed This Visit        Other    CASSI (generalized anxiety disorder) - Primary     I will switch her from venlafaxine (Effexor) 37.5 mg to fluoxetine (Prozac) 10 mg daily for 30 days. Instructed to stop the Effexor tomorrow and then start fluoxetine. Relevant Medications    FLUoxetine (PROzac) 10 MG tablet     Fatigue. Will check thyroid level and blood count if it does not resolve in few weeks. Follow-up  The patient will follow up in 6 weeks via video visit. Chief Complaint    Care Gap Request; med check         Patient ID: Heidi Nieves is a 24 y.o. female who returns for routine follow up. The patient states that she has been doing good. She mentions that on her first 2.5 weeks on venlafaxine (Effexor) 37.5 mg for her anxiety, she had a few symptoms but is now resolved. She reports that she feels fatigued every day. She thinks that the medication causes her fatigue. She can do whatever she needs to do throughout the day, but she is yawning all day long. She is taking naps because she is so exhausted. She goes to bed at the normal time and gets 7 to 8 hours of sleep every night. She has never taken anything for anxiety before. Patient is interested to try other medication to help with her anxiety. She is working since 02/2023. Objective:    /72 (BP Location: Left arm, Patient Position: Sitting, Cuff Size: Standard)   Pulse 75   Ht 5' 1" (1.549 m)   Wt 49.7 kg (109 lb 9.6 oz)   SpO2 99%   BMI 20.71 kg/m²     Wt Readings from Last 3 Encounters:   11/28/23 49.7 kg (109 lb 9.6 oz)   08/29/23 49.1 kg (108 lb 3.2 oz)   03/30/23 49.6 kg (109 lb 5.6 oz)       Physical Exam    General:  Well-developed, well-nourished, in no acute distress. Exam otherwise deferred    Transcribed for Dona Day MD, by Marlo Gaines on 11/29/23 at 8:17 AM. Powered by [employer].

## 2023-12-22 DIAGNOSIS — F41.1 GAD (GENERALIZED ANXIETY DISORDER): ICD-10-CM

## 2023-12-22 RX ORDER — FLUOXETINE 10 MG/1
10 TABLET, FILM COATED ORAL DAILY
Qty: 90 TABLET | Refills: 3 | Status: SHIPPED | OUTPATIENT
Start: 2023-12-22

## 2025-04-14 ENCOUNTER — RA CDI HCC (OUTPATIENT)
Dept: OTHER | Facility: HOSPITAL | Age: 25
End: 2025-04-14

## 2025-04-25 ENCOUNTER — TELEPHONE (OUTPATIENT)
Dept: ADMINISTRATIVE | Facility: OTHER | Age: 25
End: 2025-04-25

## 2025-04-25 ENCOUNTER — TELEPHONE (OUTPATIENT)
Dept: FAMILY MEDICINE CLINIC | Facility: CLINIC | Age: 25
End: 2025-04-25

## 2025-04-25 NOTE — TELEPHONE ENCOUNTER
----- Message from Tianna TIAN sent at 4/25/2025  9:02 AM EDT -----  Regarding: Care Gap request  04/25/25 9:02 AM    Hello, our patient attached above has had Pap Smear (HPV) aka Cervical Cancer Screening completed/performed. Please assist in updating the patient chart by pulling the Care Everywhere (CE) document. The date of service is 2025.     Thank you,  Tianna Aguirre  [facility]

## 2025-04-25 NOTE — TELEPHONE ENCOUNTER
Patient attribution    Patient is now under the care of Dr London    Please update chart and remove our office as pcp    Thank you

## 2025-04-28 NOTE — TELEPHONE ENCOUNTER
04/28/25 7:12 AM        The office's request has been received, reviewed, and the patient chart updated. The PCP has successfully been removed with a patient attribution note. This message will now be completed.        Thank you  Eric Marino

## 2025-05-05 NOTE — TELEPHONE ENCOUNTER
05/05/25 8:32 AM     Chart reviewed for Pap Smear (HPV) aka Cervical Cancer Screening was/were submitted to the patient's insurance.     Lina Merritt   [facility]

## 2025-05-05 NOTE — TELEPHONE ENCOUNTER
Upon review of the In Basket request we were able to locate, review, and update the patient chart as requested for Pap Smear (HPV) aka Cervical Cancer Screening.    Any additional questions or concerns should be emailed to the Practice Liaisons via the appropriate education email address, please do not reply via In Basket.    Thank you  Lina Merritt   [facility]

## 2025-06-27 ENCOUNTER — OFFICE VISIT (OUTPATIENT)
Dept: FAMILY MEDICINE CLINIC | Facility: CLINIC | Age: 25
End: 2025-06-27
Payer: COMMERCIAL

## 2025-06-27 VITALS
TEMPERATURE: 98 F | WEIGHT: 107.25 LBS | BODY MASS INDEX: 20.25 KG/M2 | HEART RATE: 65 BPM | OXYGEN SATURATION: 96 % | HEIGHT: 61 IN | DIASTOLIC BLOOD PRESSURE: 74 MMHG | SYSTOLIC BLOOD PRESSURE: 118 MMHG

## 2025-06-27 DIAGNOSIS — F41.1 GAD (GENERALIZED ANXIETY DISORDER): ICD-10-CM

## 2025-06-27 DIAGNOSIS — Z00.00 ANNUAL PHYSICAL EXAM: Primary | ICD-10-CM

## 2025-06-27 PROCEDURE — 99395 PREV VISIT EST AGE 18-39: CPT | Performed by: FAMILY MEDICINE

## 2025-06-27 RX ORDER — FLUOXETINE 10 MG/1
10 TABLET, FILM COATED ORAL DAILY
Qty: 90 TABLET | Refills: 3 | Status: SHIPPED | OUTPATIENT
Start: 2025-06-27

## 2025-06-27 RX ORDER — LEVONORGESTREL AND ETHINYL ESTRADIOL 0.15-0.03
1 KIT ORAL DAILY
COMMUNITY

## 2025-06-27 NOTE — PATIENT INSTRUCTIONS
"Patient Education     Routine physical for adults   The Basics   Written by the doctors and editors at [facility]   What is a physical? -- A physical is a routine visit, or \"check-up,\" with your doctor. You might also hear it called a \"wellness visit\" or \"preventive visit.\"  During each visit, the doctor will:   Ask about your physical and mental health   Ask about your habits, behaviors, and lifestyle   Do an exam   Give you vaccines if needed   Talk to you about any medicines you take   Give advice about your health   Answer your questions  Getting regular check-ups is an important part of taking care of your health. It can help your doctor find and treat any problems you have. But it's also important for preventing health problems.  A routine physical is different from a \"sick visit.\" A sick visit is when you see a doctor because of a health concern or problem. Since physicals are scheduled ahead of time, you can think about what you want to ask the doctor.  How often should I get a physical? -- It depends on your age and health. In general, for people age 21 years and older:   If you are younger than 50 years, you might be able to get a physical every 3 years.   If you are 50 years or older, your doctor might recommend a physical every year.  If you have an ongoing health condition, like diabetes or high blood pressure, your doctor will probably want to see you more often.  What happens during a physical? -- In general, each visit will include:   Physical exam - The doctor or nurse will check your height, weight, heart rate, and blood pressure. They will also look at your eyes and ears. They will ask about how you are feeling and whether you have any symptoms that bother you.   Medicines - It's a good idea to bring a list of all the medicines you take to each doctor visit. Your doctor will talk to you about your medicines and answer any questions. Tell them if you are having any side effects that bother you. You " "should also tell them if you are having trouble paying for any of your medicines.   Habits and behaviors - This includes:   Your diet   Your exercise habits   Whether you smoke, drink alcohol, or use drugs   Whether you are sexually active   Whether you feel safe at home  Your doctor will talk to you about things you can do to improve your health and lower your risk of health problems. They will also offer help and support. For example, if you want to quit smoking, they can give you advice and might prescribe medicines. If you want to improve your diet or get more physical activity, they can help you with this, too.   Lab tests, if needed - The tests you get will depend on your age and situation. For example, your doctor might want to check your:   Cholesterol   Blood sugar   Iron level   Vaccines - The recommended vaccines will depend on your age, health, and what vaccines you already had. Vaccines are very important because they can prevent certain serious or deadly infections.   Discussion of screening - \"Screening\" means checking for diseases or other health problems before they cause symptoms. Your doctor can recommend screening based on your age, risk, and preferences. This might include tests to check for:   Cancer, such as breast, prostate, cervical, ovarian, colorectal, prostate, lung, or skin cancer   Sexually transmitted infections, such as chlamydia and gonorrhea   Mental health conditions like depression and anxiety  Your doctor will talk to you about the different types of screening tests. They can help you decide which screenings to have. They can also explain what the results might mean.   Answering questions - The physical is a good time to ask the doctor or nurse questions about your health. If needed, they can refer you to other doctors or specialists, too.  Adults older than 65 years often need other care, too. As you get older, your doctor will talk to you about:   How to prevent falling at " home   Hearing or vision tests   Memory testing   How to take your medicines safely   Making sure that you have the help and support you need at home  All topics are updated as new evidence becomes available and our peer review process is complete.  This topic retrieved from [employer] on: May 02, 2024.  Topic 370471 Version 1.0  Release: 32.4.3 - C32.122  © 2024 UpToDate, Inc. and/or its affiliates. All rights reserved.  Consumer Information Use and Disclaimer   Disclaimer: This generalized information is a limited summary of diagnosis, treatment, and/or medication information. It is not meant to be comprehensive and should be used as a tool to help the user understand and/or assess potential diagnostic and treatment options. It does NOT include all information about conditions, treatments, medications, side effects, or risks that may apply to a specific patient. It is not intended to be medical advice or a substitute for the medical advice, diagnosis, or treatment of a health care provider based on the health care provider's examination and assessment of a patient's specific and unique circumstances. Patients must speak with a health care provider for complete information about their health, medical questions, and treatment options, including any risks or benefits regarding use of medications. This information does not endorse any treatments or medications as safe, effective, or approved for treating a specific patient. UpToDate, Inc. and its affiliates disclaim any warranty or liability relating to this information or the use thereof.The use of this information is governed by the Terms of Use, available at https://www.woltersbttnuwer.com/en/know/clinical-effectiveness-terms. 2024© UpToDate, Inc. and its affiliates and/or licensors. All rights reserved.  Copyright   © 2024 UpToDate, Inc. and/or its affiliates. All rights reserved.

## 2025-06-27 NOTE — PROGRESS NOTES
Adult Annual Physical  Name: Bettye Pace      : 2000      MRN: [id number]  Encounter Provider: Robert Budinetz, MD  Encounter Date: 2025   Encounter department: [facility]    :  Assessment & Plan  Annual physical exam       Reviewed age-appropriate health maintenance and preventive care.    CASSI (generalized anxiety disorder)  Refill prozac  Orders:    FLUoxetine 10 MG tablet; Take 1 tablet (10 mg total) by mouth daily        Preventive Screenings:    - Cervical cancer screening: screening up-to-date     Immunizations:  - Immunizations due: HPV (Gardasil 9)      Depression Screening and Follow-up Plan: Patient was screened for depression during today's encounter. They screened negative with a PHQ-2 score of 2.          History of Present Illness     Adult Annual Physical:  Patient presents for annual physical. Ashtyn is here for an annual checkup.  She is a very healthy and pleasant 24-year-old woman.  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  She chronically is on an oral contraceptive pill as well as Prozac for anxiety stable on both she does have a gynecologist.  She needs a refill on her Prozac.  She eats healthy she exercises her weight is ideal she does not smoke she drinks alcohol socially we have no concerns at this time..     Diet and Physical Activity:  - Diet/Nutrition: no special diet.  - Exercise: 3-4 times a week on average.    Depression Screening:  - PHQ-2 Score: 2    General Health:  - Sleep: 7-8 hours of sleep on average.  - Hearing: normal hearing bilateral ears.  - Vision: wears glasses.  - Dental: regular dental visits.    /GYN Health:  - Follows with GYN: yes.   - Last menstrual cycle: 2025.   - Contraception: oral contraceptives.      Review of Systems   Respiratory: Negative.     Cardiovascular: Negative.    Gastrointestinal: Negative.          Objective   /74 (BP Location: Left arm,  "Patient Position: Sitting, Cuff Size: Standard)   Pulse 65   Temp 98 °F (36.7 °C) (Temporal)   Ht 5' 1\" (1.549 m)   Wt 48.6 kg (107 lb 4 oz)   SpO2 96%   BMI 20.26 kg/m²     Physical Exam  Vitals and nursing note reviewed.   Constitutional:       General: She is not in acute distress.     Appearance: She is well-developed.   HENT:      Head: Normocephalic and atraumatic.     Eyes:      Conjunctiva/sclera: Conjunctivae normal.       Cardiovascular:      Rate and Rhythm: Normal rate and regular rhythm.      Heart sounds: No murmur heard.  Pulmonary:      Effort: Pulmonary effort is normal. No respiratory distress.      Breath sounds: Normal breath sounds.   Abdominal:      Palpations: Abdomen is soft.      Tenderness: There is no abdominal tenderness.     Musculoskeletal:         General: No swelling.      Cervical back: Neck supple.     Skin:     General: Skin is warm and dry.      Capillary Refill: Capillary refill takes less than 2 seconds.     Neurological:      Mental Status: She is alert.     Psychiatric:         Mood and Affect: Mood normal.         "